# Patient Record
Sex: MALE | Race: BLACK OR AFRICAN AMERICAN | NOT HISPANIC OR LATINO | Employment: FULL TIME | URBAN - METROPOLITAN AREA
[De-identification: names, ages, dates, MRNs, and addresses within clinical notes are randomized per-mention and may not be internally consistent; named-entity substitution may affect disease eponyms.]

---

## 2024-07-01 ENCOUNTER — HOSPITAL ENCOUNTER (OUTPATIENT)
Dept: RADIOLOGY | Facility: HOSPITAL | Age: 34
Discharge: HOME | End: 2024-07-01
Attending: STUDENT IN AN ORGANIZED HEALTH CARE EDUCATION/TRAINING PROGRAM
Payer: COMMERCIAL

## 2024-07-01 VITALS — WEIGHT: 210 LBS | BODY MASS INDEX: 32.96 KG/M2 | HEIGHT: 67 IN

## 2024-07-01 DIAGNOSIS — Z47.89 AFTERCARE FOLLOWING SURGERY OF THE MUSCULOSKELETAL SYSTEM: ICD-10-CM

## 2024-07-01 DIAGNOSIS — Z47.89 AFTERCARE FOLLOWING SURGERY OF THE MUSCULOSKELETAL SYSTEM: Primary | ICD-10-CM

## 2024-07-01 PROCEDURE — 99024 POSTOP FOLLOW-UP VISIT: CPT | Performed by: STUDENT IN AN ORGANIZED HEALTH CARE EDUCATION/TRAINING PROGRAM

## 2024-07-01 PROCEDURE — 73130 X-RAY EXAM OF HAND: CPT

## 2024-07-01 RX ORDER — SULFAMETHOXAZOLE AND TRIMETHOPRIM 800; 160 MG/1; MG/1
1 TABLET ORAL EVERY 12 HOURS SCHEDULED
Qty: 20 TABLET | Refills: 0 | Status: SHIPPED | OUTPATIENT
Start: 2024-07-01 | End: 2024-07-11

## 2024-07-01 NOTE — TELEPHONE ENCOUNTER
Called patient per Dr. Walters to add on to the schedule. Patient is now scheduled for today at 2:30

## 2024-07-01 NOTE — PROGRESS NOTES
Assessment/Plan:  Patient ID: 33 y.o. male   Surgery: Closed Reduction Percutaneous Pinning Right Fourth And Fifth Metacarpal Fracture/dislocations And Hamate Fracture - Right  Date of Surgery: 6/5/2024    Discussed with the patient that there does appear to be a mild infection around the transverse pin. Due to this, this pin was pulled in the office today. The other pin remains intact. A 10 day prescription was provided for Bactrim. He will keep the pin site clean and dry and can continue with peroxide around the remaining pin. A referral was provided to OT to work on motion. He can remove while at rest. Instructed to call immediately if any changes occur.    Follow Up:  As scheduled in 7/12/24    To Do Next Visit:  X-rays of the  right  hand with 10 degree supinated lateral view and Pins out      CHIEF COMPLAINT:  Chief Complaint   Patient presents with    Right Hand - Post-op         SUBJECTIVE:  Patient is a 33 y.o. year old male who presents for follow up after Closed Reduction Percutaneous Pinning Right Fourth And Fifth Metacarpal Fracture/dislocations And Hamate Fracture - Right. Today patient states he woke up this morning in more pain. He also notes increased swelling.    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 No erythema noted to hand. Moderate swelling to hand.  Range of Motion: limited MCP motion  Good IP motion  Able to fully extend digits  Neurovascular status: Neuro intact, good cap refill         STUDIES REVIEWED:  Xrays of the right hand were reviewed and independently interpreted in PACS by Dr. Walters and demonstrate CRPP 4th and 5th metacarpal fx's. The transverse pin did push in some.         PROCEDURES PERFORMED:  Procedures  No Procedures performed today    Scribe Attestation      I,:  Aaliyah Slade MA am acting as a scribe while in the presence of the attending physician.:       I,:  Otis Walters MD personally performed the services described in this documentation    as scribed in my presence.:

## 2024-07-12 ENCOUNTER — HOSPITAL ENCOUNTER (OUTPATIENT)
Dept: RADIOLOGY | Facility: HOSPITAL | Age: 34
End: 2024-07-12
Attending: STUDENT IN AN ORGANIZED HEALTH CARE EDUCATION/TRAINING PROGRAM
Payer: COMMERCIAL

## 2024-07-12 ENCOUNTER — OFFICE VISIT (OUTPATIENT)
Dept: OBGYN CLINIC | Facility: CLINIC | Age: 34
End: 2024-07-12

## 2024-07-12 VITALS — BODY MASS INDEX: 32.96 KG/M2 | WEIGHT: 210 LBS | HEIGHT: 67 IN

## 2024-07-12 DIAGNOSIS — Z47.89 AFTERCARE FOLLOWING SURGERY OF THE MUSCULOSKELETAL SYSTEM: ICD-10-CM

## 2024-07-12 DIAGNOSIS — Z47.89 AFTERCARE FOLLOWING SURGERY OF THE MUSCULOSKELETAL SYSTEM: Primary | ICD-10-CM

## 2024-07-12 PROCEDURE — 99024 POSTOP FOLLOW-UP VISIT: CPT | Performed by: STUDENT IN AN ORGANIZED HEALTH CARE EDUCATION/TRAINING PROGRAM

## 2024-07-12 PROCEDURE — 73130 X-RAY EXAM OF HAND: CPT

## 2024-07-12 NOTE — LETTER
July 12, 2024     Patient: Eric Diamond  YOB: 1990  Date of Visit: 7/12/2024      To Whom it May Concern:    Eric Diamond is under my professional care. Eric was seen in my office on 7/12/2024. Eric may return to work on 7/16/24 with a 5lb lifting restriction regarding his right upper extremity. He cannot restrain at this time. He will be re-evaluated in 6 weeks time.    If you have any questions or concerns, please don't hesitate to call.         Sincerely,          Otis Walters MD

## 2024-07-12 NOTE — PROGRESS NOTES
Assessment/Plan:  Patient ID: 33 y.o. male   Surgery: Closed Reduction Percutaneous Pinning Right Fourth And Fifth Metacarpal Fracture/dislocations And Hamate Fracture - Right  Date of Surgery: 6/5/2024    Approx. 5 weeks s/p above surgery. X-rays were performed in the office and reviewed. Pin was pulled. He starts OT on Monday. He may return to work on Tuesday 7/16/24 with 5 lb lifting restrictions and he is unable to restrain at this time. He may gradually resume activities to his tolerance.     Follow Up:  6 weeks    To Do Next Visit:  X-rays of the  right  hand with 10 degree supinated lateral view      CHIEF COMPLAINT:  Chief Complaint   Patient presents with   • Right Hand - Post-op         SUBJECTIVE:  Patient is a 33 y.o. year old male who presents for follow up after Closed Reduction Percutaneous Pinning Right Fourth And Fifth Metacarpal Fracture/dislocations And Hamate Fracture - Right. Today patient states he is doing well. He finished the oral ABX.     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 Walters and demonstrate CRPP 4th and 5th metacarpal. Pin intact to 5th metacarpal.         PROCEDURES PERFORMED:  Procedures   Pin pulled     Scribe Attestation    I,:  Mayra Slade am acting as a scribe while in the presence of the attending physician.:       I,:  Otis Walters MD personally performed the services described in this documentation    as scribed in my  presence.:

## 2024-07-15 ENCOUNTER — EVALUATION (OUTPATIENT)
Dept: OCCUPATIONAL THERAPY | Age: 34
End: 2024-07-15
Payer: COMMERCIAL

## 2024-07-15 DIAGNOSIS — Z47.89 AFTERCARE FOLLOWING SURGERY OF THE MUSCULOSKELETAL SYSTEM: ICD-10-CM

## 2024-07-15 PROCEDURE — 97165 OT EVAL LOW COMPLEX 30 MIN: CPT | Performed by: OCCUPATIONAL THERAPIST

## 2024-07-15 PROCEDURE — 97110 THERAPEUTIC EXERCISES: CPT | Performed by: OCCUPATIONAL THERAPIST

## 2024-07-15 NOTE — PROGRESS NOTES
OT Evaluation     Today's date: 7/15/2024  Patient name: Eric Diamond  : 1990  MRN: [id number]  Referring provider: Otis Walters MD  Dx:   Encounter Diagnosis     ICD-10-CM    1. Aftercare following surgery of the musculoskeletal system  Z47.89 Ambulatory Referral to PT/OT Hand Therapy                     Assessment  Impairments: abnormal or restricted ROM, impaired physical strength and pain with function    Assessment details: Bulmaro is a RHD male s/p hamate and 4+5th MC fx (PCP metacarpals, removed ). He has swelling + stiffness, pain and weakness affecting his ADLs + IADLs. He denies N/T. Recommend continued tx to reach goals.  Understanding of Dx/Px/POC: excellent     Prognosis: excellent    Goals  STG) Motion increased by 25% in 4-6 weeks to facilitate feeding   STG) Strength/Motor skills improved by 25% in 4-6 weeks to facilitate meal prep  STG) Swelling\Edema improved by 25% in 4-6 weeks  to facilitate grooming  STG) Pain decreased by 25% in 4-6 weeks to facilitate hygiene    LTG) ADL and IADL skills improved   LTG) Work skills improved  LTG) Leisure skills improved    Patient Goals: To regain full use of R hand      Goals, plan of care and treatment condition discussed with patient. Patient expresses their understanding and questions regarding these issues were addressed.      Plan  Patient would benefit from: OT eval and custom splinting  Planned modality interventions: TENS, thermotherapy: hydrocollator packs, thermotherapy: paraffin bath and ultrasound    Planned therapy interventions: neuromuscular re-education, motor coordination training, manual therapy, joint mobilization, Carter taping, strengthening, stretching, therapeutic activities, therapeutic exercise, functional ROM exercises, fine motor coordination training and orthotic fitting/training    Frequency: 1-2x week  Treatment plan discussed with: patient  Plan details: 1x a week for 1-2 weeks,  reassess for strengthening        Subjective Evaluation    History of Present Illness  Mechanism of injury: CRPP 6/ R SF RF MC with hamate fx, pins pulled . 5# restriction at this time.  Pain  Current pain ratin  At worst pain ratin    Hand dominance: right          Objective     Active Range of Motion     Right Wrist   Wrist flexion: 55 degrees   Wrist extension: 55 degrees   Radial deviation: 12 degrees   Ulnar deviation: 30 degrees     Right Digits   Flexion   Ring     MCP: 28    PIP: 72    DIP: 64  Little     MCP: 34    PIP: 68    DIP: 68    Additional Active Range of Motion Details  Full extenssion    Strength/Myotome Testing     Right Wrist/Hand   Wrist extension: 4+    Additional Strength Details  5# restriction, pins removed     Tests     Additional Tests Details  Denies N/T    Swelling     Left Wrist/Hand   Circumference MCP: 22.2 cm    Right Wrist/Hand   Circumference MCP: 23 cm             Precautions: Fx   HEP: Wrist AROM; blocking, TGE, YTP Grasp      Manuals             MEM                                                    Neuro Re-Ed             Translation             Wall walking                                                                              Ther Ex             Static grasp                                                                                                        Ther Activity                                                                              Modalities             P

## 2024-07-22 ENCOUNTER — OFFICE VISIT (OUTPATIENT)
Dept: OCCUPATIONAL THERAPY | Age: 34
End: 2024-07-22
Payer: COMMERCIAL

## 2024-07-22 DIAGNOSIS — Z47.89 AFTERCARE FOLLOWING SURGERY OF THE MUSCULOSKELETAL SYSTEM: Primary | ICD-10-CM

## 2024-07-22 PROCEDURE — 97110 THERAPEUTIC EXERCISES: CPT | Performed by: OCCUPATIONAL THERAPIST

## 2024-07-22 NOTE — PROGRESS NOTES
"Daily Note     Today's date: 2024  Patient name: Eric Diamond  : 1990  MRN: [id number]  Referring provider: Otis Walters MD  Dx:   Encounter Diagnosis     ICD-10-CM    1. Aftercare following surgery of the musculoskeletal system  Z47.89                      Subjective: \"It feels stiff in the back\"      Objective: See treatment diary below      Assessment: C/o more dorsal MC stiffness otherwise advancing well    Plan: Continue per plan of care.      Precautions: Fx   HEP: Wrist AROM; blocking, TGE, YTP Grasp      Manuals             MEM                                                    Neuro Re-Ed             Translation coins            Wall walking Tb on wall                                                                             Ther Ex             Static grasp Pencils x 5; isotubes 2x 10 sm, med RTP            Digicizer R 3x10 ind            EDC 3x10 lrg rb            grasp Pow, cyl RPW 3x10                                                                Ther Activity                                                                              Modalities             MHP 5                              "

## 2024-07-29 ENCOUNTER — APPOINTMENT (OUTPATIENT)
Dept: OCCUPATIONAL THERAPY | Age: 34
End: 2024-07-29
Payer: COMMERCIAL

## 2024-07-30 ENCOUNTER — TELEPHONE (OUTPATIENT)
Age: 34
End: 2024-07-30

## 2024-07-30 NOTE — TELEPHONE ENCOUNTER
Caller: patient   Doctor: Selena    Reason for call: checking status of forms sent via [employer]     Attachments   [email or web address]_[id number]     [id number]    Call back#: [phone]

## 2024-08-05 ENCOUNTER — OFFICE VISIT (OUTPATIENT)
Dept: OCCUPATIONAL THERAPY | Age: 34
End: 2024-08-05
Payer: COMMERCIAL

## 2024-08-05 DIAGNOSIS — Z47.89 AFTERCARE FOLLOWING SURGERY OF THE MUSCULOSKELETAL SYSTEM: Primary | ICD-10-CM

## 2024-08-05 PROCEDURE — 97110 THERAPEUTIC EXERCISES: CPT | Performed by: OCCUPATIONAL THERAPIST

## 2024-08-05 NOTE — PROGRESS NOTES
"Daily Note     Today's date: 2024  Patient name: Eric Diamond  : 1990  MRN: [id number]  Referring provider: Otis Walters MD  Dx:   Encounter Diagnosis     ICD-10-CM    1. Aftercare following surgery of the musculoskeletal system  Z47.89                      Subjective: \"It's a little sore\"      Objective: See treatment diary below      Assessment:  Tolerated fair, upgrades and end ROM both progressing    Plan: Continue per plan of care.      Precautions: Fx   HEP: Wrist AROM; blocking, TGE, YTP Grasp      Manuals            MEM                                                    Neuro Re-Ed             Translation coins KP           Wall walking Tb on wall 2 #x 10                                                                            Ther Ex             Static grasp Pencils x 5; isotubes 2x 10 sm, med RTP Pencils x 5; isotubes 2x 10 sm, med RTP           Digicizer R 3x10 ind R 3x10 ind           EDC 3x10 lrg rb 3x10 lrg rb           grasp Pow, cyl RPW 3x10 Pow, cyl GPW 3x10           Pinch +   RCP/G3                                                  Ther Activity                                                                              Modalities             MHP 5 5                               "

## 2024-08-12 ENCOUNTER — OFFICE VISIT (OUTPATIENT)
Dept: OCCUPATIONAL THERAPY | Age: 34
End: 2024-08-12
Payer: COMMERCIAL

## 2024-08-12 DIAGNOSIS — Z47.89 AFTERCARE FOLLOWING SURGERY OF THE MUSCULOSKELETAL SYSTEM: Primary | ICD-10-CM

## 2024-08-12 PROCEDURE — 97110 THERAPEUTIC EXERCISES: CPT

## 2024-08-12 NOTE — PROGRESS NOTES
Daily Note     Today's date: 2024  Patient name: Eric Diamond  : 1990  MRN: [id number]  Referring provider: Otis Walters MD  Dx:   Encounter Diagnosis     ICD-10-CM    1. Aftercare following surgery of the musculoskeletal system  Z47.89                      Subjective: It's mainly only stiff in the monrings. It has been feeling pretty good.       Objective: See treatment diary below      Assessment: Tolerated upgrades well. No reports of pain with PREs. Increased difficulty with sustained grasp. Able to create full fist AROM.     Plan: Continue per plan of care.      Precautions: Fx   HEP: Wrist AROM; blocking, TGE, YTP Grasp      Manuals           MEM                                                    Neuro Re-Ed             Translation coins KP           Wall walking Tb on wall 2 #x 10 2# x10                                                                           Ther Ex             Static grasp Pencils x 5; isotubes 2x 10 sm, med RTP Pencils x 5; isotubes 2x 10 sm, med RTP Sm dowel RTP 40x           Digicizer R 3x10 ind R 3x10 ind R 3x10 ind    30x comp          EDC 3x10 lrg rb 3x10 lrg rb 3x10 O gummy          grasp Pow, cyl RPW 3x10 Pow, cyl GPW 3x10 Power;cyl BluPW center;side 30x          Pinch +   RCP/G3 GCP G4          Sustained   [id number]                                    Ther Activity                                                                              Modalities             MHP 5 5 5'

## 2024-08-20 ENCOUNTER — OFFICE VISIT (OUTPATIENT)
Dept: OCCUPATIONAL THERAPY | Age: 34
End: 2024-08-20
Payer: COMMERCIAL

## 2024-08-20 DIAGNOSIS — Z47.89 AFTERCARE FOLLOWING SURGERY OF THE MUSCULOSKELETAL SYSTEM: Primary | ICD-10-CM

## 2024-08-20 PROCEDURE — 97110 THERAPEUTIC EXERCISES: CPT | Performed by: OCCUPATIONAL THERAPIST

## 2024-08-23 ENCOUNTER — HOSPITAL ENCOUNTER (OUTPATIENT)
Dept: RADIOLOGY | Facility: HOSPITAL | Age: 34
End: 2024-08-23
Attending: STUDENT IN AN ORGANIZED HEALTH CARE EDUCATION/TRAINING PROGRAM
Payer: COMMERCIAL

## 2024-08-23 VITALS — BODY MASS INDEX: 32.96 KG/M2 | HEIGHT: 67 IN | WEIGHT: 210 LBS

## 2024-08-23 DIAGNOSIS — Z47.89 AFTERCARE FOLLOWING SURGERY OF THE MUSCULOSKELETAL SYSTEM: ICD-10-CM

## 2024-08-23 DIAGNOSIS — Z47.89 AFTERCARE FOLLOWING SURGERY OF THE MUSCULOSKELETAL SYSTEM: Primary | ICD-10-CM

## 2024-08-23 PROCEDURE — 73130 X-RAY EXAM OF HAND: CPT

## 2024-08-23 PROCEDURE — 99024 POSTOP FOLLOW-UP VISIT: CPT | Performed by: STUDENT IN AN ORGANIZED HEALTH CARE EDUCATION/TRAINING PROGRAM

## 2024-08-23 NOTE — LETTER
August 23, 2024     Patient: Eric Diamond  YOB: 1990  Date of Visit: 8/23/2024      To Whom it May Concern:    Eric Diamond is under my professional care. Eric was seen in my office on 8/23/2024. Eric may return to work on Monday 8/26/24 full duty with no restrictions.     If you have any questions or concerns, please don't hesitate to call.         Sincerely,          Otis Walters MD

## 2024-08-23 NOTE — PROGRESS NOTES
Assessment/Plan:  Patient ID: 33 y.o. male   Surgery: Closed Reduction Percutaneous Pinning Right Fourth And Fifth Metacarpal Fracture/dislocations And Hamate Fracture - Right  Date of Surgery: 6/5/2024    The patient is doing well. X-rays were reviewed which demonstrate good healing. He has no restrictions at this time. He may return to work on Monday full duty with no restrictions and a note was provided for this today.    Follow Up:  PRN    To Do Next Visit:  Re-evaluation of current issue      CHIEF COMPLAINT:  Chief Complaint   Patient presents with   • Right Hand - Post-op         SUBJECTIVE:  Patient is a 33 y.o. year old male who presents for follow up after Closed Reduction Percutaneous Pinning Right Fourth And Fifth Metacarpal Fracture/dislocations And Hamate Fracture - Right. Today patient states he is doing well. He denies any issues. He has been attending formal therapy.    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 Walters and demonstrate healed 4th and 5th metacarpal fractures.        PROCEDURES PERFORMED:  Procedures  No Procedures performed today    Scribe Attestation    I,:  Aaliyah Slade MA am acting as a scribe while in the presence of the attending physician.:       I,:  Otis Walters MD personally performed the services described in this documentation    as scribed in my presence.:

## 2024-12-22 NOTE — PROGRESS NOTES
"Daily Note     Today's date: 2024  Patient name: Eric Diamond  : 1990  MRN: [id number]  Referring provider: Otis Walters MD  Dx:   Encounter Diagnosis     ICD-10-CM    1. Aftercare following surgery of the musculoskeletal system  Z47.89                      Subjective: \"no pain, stiff in the morning but gets better as the day goes on.\"       Objective: See treatment diary below      Assessment: R  strength 44.2#- low for normal value; Tx focused on  and pinch strength/endurance. Completed F+, rest breaks needed.  ROM WFLs wrist and digits.    Plan: Continue per plan of care.      Precautions: Fx   HEP: Wrist AROM; blocking, TGE, YTP Grasp      Manuals           MEM                                                    Neuro Re-Ed             Translation coins KP           Wall walking Tb on wall 2 #x 10 2# x10 2# x10         ROM    Hook rools, hook 2# stretch                                                             Ther Ex             Static grasp Pencils x 5; isotubes 2x 10 sm, med RTP Pencils x 5; isotubes 2x 10 sm, med RTP Sm dowel RTP 40x  Level 4 green gripper pegs, Black and blue CP with pegs; grene putty push downs with dowel         Digicizer R 3x10 ind R 3x10 ind R 3x10 ind    30x comp          EDC 3x10 lrg rb 3x10 lrg rb 3x10 O gummy 3x10 O gummy         grasp Pow, cyl RPW 3x10 Pow, cyl GPW 3x10 Power;cyl BluPW center;side 30x          Pinch +   RCP/G3 GCP G4          Sustained   [id number]                                    Ther Activity                                                                              Modalities             MHP 5 5 5' 5'                             "
As per chart, patient has been on Lamictal, Lexapro, Prozac, Cymbalta in the past.